# Patient Record
Sex: MALE | Race: WHITE | NOT HISPANIC OR LATINO
[De-identification: names, ages, dates, MRNs, and addresses within clinical notes are randomized per-mention and may not be internally consistent; named-entity substitution may affect disease eponyms.]

---

## 2020-01-02 ENCOUNTER — APPOINTMENT (OUTPATIENT)
Dept: INTERVENTIONAL RADIOLOGY/VASCULAR | Facility: HOSPITAL | Age: 55
End: 2020-01-02
Payer: COMMERCIAL

## 2020-01-02 DIAGNOSIS — C78.7 MALIGNANT NEOPLASM OF ESOPHAGUS, UNSPECIFIED: ICD-10-CM

## 2020-01-02 DIAGNOSIS — F17.200 NICOTINE DEPENDENCE, UNSPECIFIED, UNCOMPLICATED: ICD-10-CM

## 2020-01-02 DIAGNOSIS — C15.9 MALIGNANT NEOPLASM OF ESOPHAGUS, UNSPECIFIED: ICD-10-CM

## 2020-01-02 DIAGNOSIS — K21.9 GASTRO-ESOPHAGEAL REFLUX DISEASE W/OUT ESOPHAGITIS: ICD-10-CM

## 2020-01-02 DIAGNOSIS — Z86.711 PERSONAL HISTORY OF PULMONARY EMBOLISM: ICD-10-CM

## 2020-01-02 DIAGNOSIS — G57.93 UNSPECIFIED MONONEUROPATHY OF BILATERAL LOWER LIMBS: ICD-10-CM

## 2020-01-02 DIAGNOSIS — Z72.89 OTHER PROBLEMS RELATED TO LIFESTYLE: ICD-10-CM

## 2020-01-02 PROCEDURE — 99244 OFF/OP CNSLTJ NEW/EST MOD 40: CPT

## 2020-01-03 VITALS
OXYGEN SATURATION: 97 % | SYSTOLIC BLOOD PRESSURE: 109 MMHG | DIASTOLIC BLOOD PRESSURE: 63 MMHG | WEIGHT: 165 LBS | BODY MASS INDEX: 25.9 KG/M2 | HEIGHT: 67 IN | RESPIRATION RATE: 14 BRPM | HEART RATE: 94 BPM

## 2020-01-03 PROBLEM — Z86.711 HISTORY OF PULMONARY EMBOLISM: Status: RESOLVED | Noted: 2020-01-03 | Resolved: 2020-01-03

## 2020-01-03 PROBLEM — K21.9 GERD (GASTROESOPHAGEAL REFLUX DISEASE): Status: ACTIVE | Noted: 2020-01-03

## 2020-01-03 PROBLEM — Z00.00 ENCOUNTER FOR PREVENTIVE HEALTH EXAMINATION: Status: ACTIVE | Noted: 2020-01-03

## 2020-01-03 PROBLEM — C15.9 ESOPHAGEAL CANCER: Status: ACTIVE | Noted: 2020-01-03

## 2020-01-03 PROBLEM — F17.200 CURRENT SMOKER: Status: ACTIVE | Noted: 2020-01-03

## 2020-01-03 PROBLEM — Z72.89 ALCOHOL USE: Status: ACTIVE | Noted: 2020-01-03

## 2020-01-03 PROBLEM — G57.93 NEUROPATHY INVOLVING BOTH LOWER EXTREMITIES: Status: ACTIVE | Noted: 2020-01-03

## 2020-01-03 PROBLEM — C15.9: Status: ACTIVE | Noted: 2020-01-03

## 2020-01-03 RX ORDER — MONTELUKAST SODIUM 4 MG/1
4 GRANULE ORAL
Refills: 0 | Status: ACTIVE | COMMUNITY

## 2020-01-03 RX ORDER — PANTOPRAZOLE SODIUM 40 MG/1
40 TABLET, DELAYED RELEASE ORAL
Refills: 0 | Status: ACTIVE | COMMUNITY

## 2020-01-03 RX ORDER — APIXABAN 5 MG/1
5 TABLET, FILM COATED ORAL
Refills: 0 | Status: ACTIVE | COMMUNITY

## 2020-01-03 NOTE — ASSESSMENT
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  Liver metastasis enlarging despite chemotherapy.  Plan for yttrium 90 radioembolizaiton

## 2020-01-03 NOTE — HISTORY OF PRESENT ILLNESS
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  Underwent chemotherapy and radiation treatment and then minimally invasive esophagogastrectomy 11.30.2018 at [facility].  Allegedly two lymph nodes out of 20 positive for residual disease according to wife.  Metastatic disease discovered 6.2018 in liver lungs mediastinal and abdominal adenopathy and L5 vertebral body.  Biopsy of liver lesion 7.25.2018 consistent with esophageal metastasis. PDL 1 expression negative.  \par Received six cycles of FOLFOX with herceptin.  10.25.2018 CT showed decrease in mediastinal LNs and Liver disease.  Maintained on Herceptin.  2.2019 PET showed new right lobe liver metastasis and new anterior mediastinal adenopathy.  Chemotherapy changed to paclitaxel and ramucimurab with response on 5.15.19 PET.    10.3.2019 PET showed increase in right lobe liver lesion and chemotherapy switched to Herceptin and Taxotere.  CT 12.16.2019 showed significant increase in size of liver lesion right lobe

## 2020-01-03 NOTE — CONSULT LETTER
[Dear  ___] : Dear  [unfilled], [Consult Letter:] : I had the pleasure of evaluating your patient, [unfilled]. [Consult Closing:] : Thank you very much for allowing me to participate in the care of this patient.  If you have any questions, please do not hesitate to contact me. [Please see my note below.] : Please see my note below. [Sincerely,] : Sincerely, [FreeTextEntry3] : tOtoniel Salgado MD, FSIR\par Chief Interventional Radiology\par [facility]\par [facility]\par

## 2020-01-03 NOTE — PHYSICAL EXAM
[Alert] : alert [No Acute Distress] : no acute distress [Well Developed] : well developed [Well Nourished] : well nourished [Normal Sclera/Conjunctiva] : normal sclera/conjunctiva [EOMI] : extra occular movement intact [No Neck Mass] : no neck mass was observed [Normal Oropharynx] : the oropharynx was normal [No Thyroid Nodules] : there were no palpable thyroid nodules [No Respiratory Distress] : no respiratory distress [No Accessory Muscle Use] : no accessory muscle use [Clear to Auscultation] : lungs were clear to auscultation bilaterally [Normal Rate] : heart rate was normal  [Normal S1, S2] : normal S1 and S2 [Regular Rhythm] : with a regular rhythm [Femoral Arteries Normal] : femoral pulses were normal without bruits [No Edema] : there was no peripheral edema [Not Tender] : non-tender [Normal Bowel Sounds] : normal bowel sounds [Soft] : abdomen soft [Not Distended] : not distended [Normal Post Cervical Nodes] : posterior cervical nodes [Normal Anterior Cervical Nodes] : anterior cervical nodes [No CVA Tenderness] : no ~M costovertebral angle tenderness [Normal Axillary Nodes] : axillary nodes [Spine Straight] : spine straight [No Spinal Tenderness] : no spinal tenderness [Normal Gait] : normal gait [No Rash] : no rash [Acanthosis Nigricans___] : no acanthosis nigricans [No Tremors] : no tremors [Normal Affect] : the affect was normal [Oriented x3] : oriented to person, place, and time [Normal Mood] : the mood was normal [Fully active, able to carry on all pre-disease performance without restriction] : Fully active, able to carry on all pre-disease performance without restriction

## 2020-01-03 NOTE — REVIEW OF SYSTEMS
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

## 2020-01-20 ENCOUNTER — FORM ENCOUNTER (OUTPATIENT)
Age: 55
End: 2020-01-20

## 2020-01-21 ENCOUNTER — APPOINTMENT (OUTPATIENT)
Dept: INTERVENTIONAL RADIOLOGY/VASCULAR | Facility: HOSPITAL | Age: 55
End: 2020-01-21
Payer: COMMERCIAL

## 2020-01-21 ENCOUNTER — TRANSCRIPTION ENCOUNTER (OUTPATIENT)
Age: 55
End: 2020-01-21

## 2020-01-21 ENCOUNTER — OUTPATIENT (OUTPATIENT)
Dept: OUTPATIENT SERVICES | Facility: HOSPITAL | Age: 55
LOS: 1 days | End: 2020-01-21
Payer: COMMERCIAL

## 2020-01-21 ENCOUNTER — FORM ENCOUNTER (OUTPATIENT)
Age: 55
End: 2020-01-21

## 2020-01-21 LAB
ALBUMIN SERPL ELPH-MCNC: 3.5 G/DL — SIGNIFICANT CHANGE UP (ref 3.3–5)
ALP SERPL-CCNC: 191 U/L — HIGH (ref 40–120)
ALT FLD-CCNC: 16 U/L — SIGNIFICANT CHANGE UP (ref 10–45)
ANION GAP SERPL CALC-SCNC: 9 MMOL/L — SIGNIFICANT CHANGE UP (ref 5–17)
AST SERPL-CCNC: 26 U/L — SIGNIFICANT CHANGE UP (ref 10–40)
BILIRUB SERPL-MCNC: 0.2 MG/DL — SIGNIFICANT CHANGE UP (ref 0.2–1.2)
BUN SERPL-MCNC: 10 MG/DL — SIGNIFICANT CHANGE UP (ref 7–23)
CALCIUM SERPL-MCNC: 10 MG/DL — SIGNIFICANT CHANGE UP (ref 8.4–10.5)
CHLORIDE SERPL-SCNC: 103 MMOL/L — SIGNIFICANT CHANGE UP (ref 96–108)
CO2 SERPL-SCNC: 25 MMOL/L — SIGNIFICANT CHANGE UP (ref 22–31)
CREAT SERPL-MCNC: 0.84 MG/DL — SIGNIFICANT CHANGE UP (ref 0.5–1.3)
GLUCOSE SERPL-MCNC: 114 MG/DL — HIGH (ref 70–99)
POTASSIUM SERPL-MCNC: 4.8 MMOL/L — SIGNIFICANT CHANGE UP (ref 3.5–5.3)
POTASSIUM SERPL-SCNC: 4.8 MMOL/L — SIGNIFICANT CHANGE UP (ref 3.5–5.3)
PROT SERPL-MCNC: 6.3 G/DL — SIGNIFICANT CHANGE UP (ref 6–8.3)
SODIUM SERPL-SCNC: 137 MMOL/L — SIGNIFICANT CHANGE UP (ref 135–145)

## 2020-01-21 PROCEDURE — 36248 INS CATH ABD/L-EXT ART ADDL: CPT | Mod: 59

## 2020-01-21 PROCEDURE — 78830 RP LOCLZJ TUM SPECT W/CT 1: CPT | Mod: 26

## 2020-01-21 PROCEDURE — 77300 RADIATION THERAPY DOSE PLAN: CPT | Mod: 26

## 2020-01-21 PROCEDURE — 36247 INS CATH ABD/L-EXT ART 3RD: CPT

## 2020-01-21 PROCEDURE — 75726 ARTERY X-RAYS ABDOMEN: CPT | Mod: 26,59

## 2020-01-21 PROCEDURE — 77263 THER RADIOLOGY TX PLNG CPLX: CPT

## 2020-01-21 PROCEDURE — 36245 INS CATH ABD/L-EXT ART 1ST: CPT | Mod: 59

## 2020-01-21 PROCEDURE — 75774 ARTERY X-RAY EACH VESSEL: CPT | Mod: 26,76

## 2020-01-21 PROCEDURE — 75774 ARTERY X-RAY EACH VESSEL: CPT | Mod: 26,76,59

## 2020-01-21 PROCEDURE — 75726 ARTERY X-RAYS ABDOMEN: CPT | Mod: 26

## 2020-01-22 ENCOUNTER — APPOINTMENT (OUTPATIENT)
Dept: INTERVENTIONAL RADIOLOGY/VASCULAR | Facility: HOSPITAL | Age: 55
End: 2020-01-22
Payer: COMMERCIAL

## 2020-01-22 ENCOUNTER — APPOINTMENT (OUTPATIENT)
Dept: ULTRASOUND IMAGING | Facility: HOSPITAL | Age: 55
End: 2020-01-22
Payer: COMMERCIAL

## 2020-01-22 ENCOUNTER — OUTPATIENT (OUTPATIENT)
Dept: OUTPATIENT SERVICES | Facility: HOSPITAL | Age: 55
LOS: 1 days | End: 2020-01-22
Payer: COMMERCIAL

## 2020-01-22 PROCEDURE — 36247 INS CATH ABD/L-EXT ART 3RD: CPT

## 2020-01-22 PROCEDURE — 79445 NUCLEAR RX INTRA-ARTERIAL: CPT | Mod: 26

## 2020-01-22 PROCEDURE — 37243 VASC EMBOLIZE/OCCLUDE ORGAN: CPT

## 2020-01-22 PROCEDURE — 93970 EXTREMITY STUDY: CPT

## 2020-01-22 PROCEDURE — 77300 RADIATION THERAPY DOSE PLAN: CPT

## 2020-01-22 PROCEDURE — 93970 EXTREMITY STUDY: CPT | Mod: 26

## 2020-01-22 PROCEDURE — C1894: CPT

## 2020-01-22 PROCEDURE — C1887: CPT

## 2020-01-22 PROCEDURE — 78830 RP LOCLZJ TUM SPECT W/CT 1: CPT

## 2020-01-22 PROCEDURE — 79445 NUCLEAR RX INTRA-ARTERIAL: CPT

## 2020-01-22 PROCEDURE — 80053 COMPREHEN METABOLIC PANEL: CPT

## 2020-01-22 PROCEDURE — 75726 ARTERY X-RAYS ABDOMEN: CPT

## 2020-01-22 PROCEDURE — C1769: CPT

## 2020-01-22 PROCEDURE — 36245 INS CATH ABD/L-EXT ART 1ST: CPT | Mod: 59

## 2020-01-22 PROCEDURE — C2616: CPT

## 2020-01-22 PROCEDURE — 75774 ARTERY X-RAY EACH VESSEL: CPT

## 2020-01-22 PROCEDURE — 78830 RP LOCLZJ TUM SPECT W/CT 1: CPT | Mod: 26

## 2020-01-22 PROCEDURE — 36415 COLL VENOUS BLD VENIPUNCTURE: CPT

## 2020-01-22 PROCEDURE — 36248 INS CATH ABD/L-EXT ART ADDL: CPT | Mod: 59

## 2020-01-22 RX ORDER — LIDOCAINE 4 G/100G
1 CREAM TOPICAL ONCE
Refills: 0 | Status: DISCONTINUED | OUTPATIENT
Start: 2020-01-22 | End: 2020-02-06

## 2020-01-22 RX ORDER — ENOXAPARIN SODIUM 100 MG/ML
80 INJECTION SUBCUTANEOUS ONCE
Refills: 0 | Status: DISCONTINUED | OUTPATIENT
Start: 2020-01-22 | End: 2020-02-06